# Patient Record
Sex: FEMALE | Race: WHITE | URBAN - METROPOLITAN AREA
[De-identification: names, ages, dates, MRNs, and addresses within clinical notes are randomized per-mention and may not be internally consistent; named-entity substitution may affect disease eponyms.]

---
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
## 2023-06-21 RX ORDER — IPRATROPIUM BROMIDE 21 UG/1
SPRAY, METERED NASAL
COMMUNITY
Start: 2020-02-10 | End: 2024-02-26 | Stop reason: ALTCHOICE

## 2023-06-21 ASSESSMENT — ENCOUNTER SYMPTOMS
COUGH: 1
STRIDOR: 1
HEADACHES: 1
HOARSE VOICE: 1
TROUBLE SWALLOWING: 1
ABDOMINAL PAIN: 0
SWOLLEN GLANDS: 1
DIARRHEA: 0
SHORTNESS OF BREATH: 0
NECK PAIN: 1
VOMITING: 0
SORE THROAT: 1

## 2023-06-23 ENCOUNTER — OFFICE VISIT (OUTPATIENT)
Dept: PRIMARY CARE | Facility: CLINIC | Age: 23
End: 2023-06-23
Payer: COMMERCIAL

## 2023-06-23 VITALS
RESPIRATION RATE: 18 BRPM | SYSTOLIC BLOOD PRESSURE: 120 MMHG | DIASTOLIC BLOOD PRESSURE: 80 MMHG | TEMPERATURE: 97.5 F | HEART RATE: 80 BPM | HEIGHT: 64 IN | OXYGEN SATURATION: 98 % | BODY MASS INDEX: 28.09 KG/M2 | WEIGHT: 164.5 LBS

## 2023-06-23 DIAGNOSIS — J02.9 SORE THROAT: ICD-10-CM

## 2023-06-23 DIAGNOSIS — Z00.00 HEALTH CARE MAINTENANCE: ICD-10-CM

## 2023-06-23 DIAGNOSIS — J40 BRONCHITIS: Primary | ICD-10-CM

## 2023-06-23 DIAGNOSIS — R09.81 CONGESTION OF NASAL SINUS: ICD-10-CM

## 2023-06-23 DIAGNOSIS — R05.1 ACUTE COUGH: ICD-10-CM

## 2023-06-23 DIAGNOSIS — H92.03 OTALGIA OF BOTH EARS: ICD-10-CM

## 2023-06-23 PROCEDURE — 99213 OFFICE O/P EST LOW 20 MIN: CPT | Performed by: FAMILY MEDICINE

## 2023-06-23 PROCEDURE — 1036F TOBACCO NON-USER: CPT | Performed by: FAMILY MEDICINE

## 2023-06-23 RX ORDER — AZITHROMYCIN 250 MG/1
TABLET, FILM COATED ORAL
Qty: 6 TABLET | Refills: 0 | Status: SHIPPED | OUTPATIENT
Start: 2023-06-23 | End: 2023-06-28

## 2023-06-23 ASSESSMENT — ENCOUNTER SYMPTOMS
STRIDOR: 1
DIARRHEA: 0
VOMITING: 0
TROUBLE SWALLOWING: 1
NECK PAIN: 1
SHORTNESS OF BREATH: 0
HEADACHES: 1
HOARSE VOICE: 1
SORE THROAT: 1
SWOLLEN GLANDS: 1
ABDOMINAL PAIN: 0
COUGH: 1

## 2023-06-23 ASSESSMENT — PATIENT HEALTH QUESTIONNAIRE - PHQ9
1. LITTLE INTEREST OR PLEASURE IN DOING THINGS: NOT AT ALL
SUM OF ALL RESPONSES TO PHQ9 QUESTIONS 1 AND 2: 0
2. FEELING DOWN, DEPRESSED OR HOPELESS: NOT AT ALL

## 2023-06-23 NOTE — PROGRESS NOTES
Subjective   Patient ID: Tray Harris is a 23 y.o. female who presents for Establish Care, Cough, and Sore Throat.    HIP    Establish care  Last PCP -- DR. DIAZ   Reason for leaving -- AGE  How did you hear about us -- DAD IS A PATIENT     Sore Throat   This is a new problem. The current episode started in the past 7 days. The problem has been waxing and waning. Neither side of throat is experiencing more pain than the other. There has been no fever. The pain is at a severity of 8/10. Associated symptoms include congestion, coughing, ear pain, headaches, a hoarse voice, neck pain, stridor, swollen glands and trouble swallowing. Pertinent negatives include no abdominal pain, diarrhea, drooling, ear discharge, plugged ear sensation, shortness of breath or vomiting. She has had no exposure to strep or mono.   Cough  This is a new problem. The current episode started in the past 7 days. The cough is Non-productive. Associated symptoms include ear pain, headaches and a sore throat. Pertinent negatives include no shortness of breath.   Earache   This is a new problem. The current episode started in the past 7 days. The problem has been unchanged. There has been no fever. Associated symptoms include coughing, headaches, neck pain and a sore throat. Pertinent negatives include no abdominal pain, diarrhea, ear discharge or vomiting.     ROS  Constitutional- No activity change. No appetite change.  Eyes- Denies vision changes.  Respiratory- No shortness of breath.  Cardiovascular- No palpitations. No chest pain.  GI- No nausea or vomiting. No diarrhea or constipation. Denies abdominal pain.  Musculoskeletal- Denies joint swelling.  Extremities- No edema.  Neurological- Denies headaches. Denies dizziness.  Skin- No rashes.  Psychiatric/Behavioral- Denies significant anxiety, or depressed mood.     Objective     /80 (BP Location: Right arm, Patient Position: Sitting, BP Cuff Size: Adult)   Pulse 80   Temp 36.4 °C (97.5  "°F) (Temporal)   Resp 18   Ht 1.626 m (5' 4\")   Wt 74.6 kg (164 lb 8 oz)   SpO2 98%   BMI 28.24 kg/m²     No Known Allergies    Constitutional-- Well-nourished.  No distress  Head- unremarkable.  Ears- TMs clear.  Eyes- PERRL.  Conjunctiva normal.  Nose- significant for clear rhinorrhea and mild bilateral maxillary sinus pressure  Throat- Oropharynx is mildly inflamed  Neck- Supple with no thyromegaly.  Mild anterior cervical adenopathy noted.  Pulmonary/Chest- Breath sounds with normal effort.  Trace upper airway rhonchi  Heart- Regular rate and rhythm.  No murmur.  Abdomen- Soft and non-tender.  No masses noted.  Extremities- no edema.        Assessment/Plan   1. Bronchitis  azithromycin (Zithromax) 250 mg tablet      2. Health care maintenance        3. Sore throat        4. Acute cough        5. Otalgia of both ears        6. Congestion of nasal sinus           Long talk. Treatment options reviewed.  Mood stable  BP controlled  Asthma controlled    Bronchitis:  Start Z-pack  Use over the counter nasal decongestants, cough suppressants, and cold/flu remedies as needed.    Continue and take your medications as prescribed.    Health Maintenance issues discussed.    Importance of healthy diet and regular exercise regimen discussed.    We will contact you with any test results ordered. If you do not hear from us, please contact.    Follow-up as instructed or sooner if any problems or symptoms do not resolve as expected.    Scribe Attestation  By signing my name below, I Demetrius Giovani Parker   attest that this documentation has been prepared under the direction and in the presence of Vinicio Elizalde MD.    "

## 2024-02-23 NOTE — PROGRESS NOTES
"Subjective   Patient ID: Tray Harris is a 23 y.o. female who presents for Cough.  HPI    Patient presents today complaining of cough/congestion x 3 weeks.  Patient reports cough is productive   Has tried OTC DayQuil with no relief       Problem list discussed.    Overall doing well.  Eating okay.  Staying active.    Has no other new problem /question.    ROS  Constitutional- No activity change. No appetite change.  Eyes- Denies vision changes.  Respiratory- No shortness of breath.  Cardiovascular- No palpitations. No chest pain.  GI- No nausea or vomiting. No diarrhea or constipation. Denies abdominal pain.  Musculoskeletal- Denies joint swelling.  Neurological- Denies headaches. Denies dizziness.  Skin- No rashes.  Psychiatric/Behavioral- Denies significant anxiety, or depressed mood.     Objective     /76 (BP Location: Left arm, Patient Position: Sitting, BP Cuff Size: Adult)   Pulse 90   Temp 36.6 °C (97.9 °F) (Oral)   Ht 1.626 m (5' 4\")   Wt 87.4 kg (192 lb 9.6 oz)   SpO2 98%   BMI 33.06 kg/m²     No Known Allergies    Constitutional-- Well-nourished.  No distress  Head- unremarkable.  Ears- TMs clear.  Eyes- PERRL.  Conjunctiva normal.  Nose- significant for clear rhinorrhea and mild bilateral maxillary sinus pressure  Throat- Oropharynx is mildly inflamed  Neck- Supple with no thyromegaly.  Mild anterior cervical adenopathy noted.  Pulmonary/Chest- Breath sounds with normal effort.  Trace upper airway rhonchi  Heart- Regular rate and rhythm.  No murmur.  Abdomen- Soft and non-tender.  No masses noted.  Extremities- no edema.  Skin no rashes      Assessment/Plan   1. Bronchitis  azithromycin (Zithromax) 250 mg tablet    benzonatate (Tessalon) 100 mg capsule      2. Upper respiratory tract infection, unspecified type  azithromycin (Zithromax) 250 mg tablet             Long talk. Treatment options reviewed.  Start antibiotic as directed.  Tessalon Perles as needed for cough    Continue and take your " medications as prescribed.    Health Maintenance issues discussed.      Follow-up as instructed or sooner if any problems or symptoms do not resolve as expected.

## 2024-02-24 ASSESSMENT — ENCOUNTER SYMPTOMS
HEARTBURN: 1
WEIGHT LOSS: 0
SHORTNESS OF BREATH: 0
MYALGIAS: 0
SWEATS: 0
COUGH: 1
HEADACHES: 1
RHINORRHEA: 1
SORE THROAT: 1
FEVER: 0
HEMOPTYSIS: 0
CHILLS: 0
WHEEZING: 0

## 2024-02-26 ENCOUNTER — OFFICE VISIT (OUTPATIENT)
Dept: PRIMARY CARE | Facility: CLINIC | Age: 24
End: 2024-02-26
Payer: COMMERCIAL

## 2024-02-26 VITALS
DIASTOLIC BLOOD PRESSURE: 76 MMHG | HEIGHT: 64 IN | TEMPERATURE: 97.9 F | HEART RATE: 90 BPM | SYSTOLIC BLOOD PRESSURE: 118 MMHG | BODY MASS INDEX: 32.88 KG/M2 | OXYGEN SATURATION: 98 % | WEIGHT: 192.6 LBS

## 2024-02-26 DIAGNOSIS — J40 BRONCHITIS: Primary | ICD-10-CM

## 2024-02-26 DIAGNOSIS — J06.9 UPPER RESPIRATORY TRACT INFECTION, UNSPECIFIED TYPE: ICD-10-CM

## 2024-02-26 PROCEDURE — 99213 OFFICE O/P EST LOW 20 MIN: CPT | Performed by: FAMILY MEDICINE

## 2024-02-26 PROCEDURE — 1036F TOBACCO NON-USER: CPT | Performed by: FAMILY MEDICINE

## 2024-02-26 RX ORDER — AZITHROMYCIN 250 MG/1
TABLET, FILM COATED ORAL
Qty: 6 TABLET | Refills: 0 | Status: SHIPPED | OUTPATIENT
Start: 2024-02-26 | End: 2024-03-01

## 2024-02-26 RX ORDER — BENZONATATE 100 MG/1
100 CAPSULE ORAL 3 TIMES DAILY PRN
Qty: 30 CAPSULE | Refills: 0 | Status: SHIPPED | OUTPATIENT
Start: 2024-02-26 | End: 2024-03-27

## 2024-02-26 ASSESSMENT — PATIENT HEALTH QUESTIONNAIRE - PHQ9
2. FEELING DOWN, DEPRESSED OR HOPELESS: NOT AT ALL
1. LITTLE INTEREST OR PLEASURE IN DOING THINGS: NOT AT ALL
SUM OF ALL RESPONSES TO PHQ9 QUESTIONS 1 AND 2: 0

## 2024-10-16 ENCOUNTER — APPOINTMENT (OUTPATIENT)
Dept: PRIMARY CARE | Facility: CLINIC | Age: 24
End: 2024-10-16
Payer: COMMERCIAL

## 2024-10-16 VITALS
WEIGHT: 192 LBS | BODY MASS INDEX: 32.78 KG/M2 | HEART RATE: 79 BPM | DIASTOLIC BLOOD PRESSURE: 78 MMHG | SYSTOLIC BLOOD PRESSURE: 122 MMHG | OXYGEN SATURATION: 97 % | TEMPERATURE: 96.5 F | HEIGHT: 64 IN

## 2024-10-16 DIAGNOSIS — K29.00 ACUTE GASTRITIS WITHOUT HEMORRHAGE, UNSPECIFIED GASTRITIS TYPE: ICD-10-CM

## 2024-10-16 DIAGNOSIS — R10.9 ABDOMINAL PAIN, UNSPECIFIED ABDOMINAL LOCATION: Primary | ICD-10-CM

## 2024-10-16 PROCEDURE — 99213 OFFICE O/P EST LOW 20 MIN: CPT | Performed by: FAMILY MEDICINE

## 2024-10-16 PROCEDURE — 1036F TOBACCO NON-USER: CPT | Performed by: FAMILY MEDICINE

## 2024-10-16 PROCEDURE — 3008F BODY MASS INDEX DOCD: CPT | Performed by: FAMILY MEDICINE

## 2024-10-16 RX ORDER — FAMOTIDINE 20 MG/1
20 TABLET, FILM COATED ORAL 2 TIMES DAILY
Qty: 60 TABLET | Refills: 1 | Status: SHIPPED | OUTPATIENT
Start: 2024-10-16 | End: 2024-12-15

## 2024-10-16 NOTE — PROGRESS NOTES
"Subjective   Patient ID: Tray Harris is a 24 y.o. female who presents for Abdominal Pain.  HPI    Patient presents in office today for an ER follow up. Patient went for Abdominal pain. Ongoing x 6 days. Patient admits she has tried Tylenol OTC, and Midol. Patient had BW, Urine testing, and US gallbladder done while in the ER. Patient went to [facility] on 10/1/24.  Symptoms localized to the epigastric area.  Denies any black or tarry stools ever.  Denies any blood in her stool.    Patient would like the US to be reviewed. Patient states that they told her that the right kidney was inflamed. Patient does have an upcoming appointment for a CT scan.    Problem list discussed.    Overall doing better.  Symptoms have essentially resolved.  Eating okay.  Staying active.    Has no other new problem /question.     ROS  Constitutional- No activity change. No appetite change.  Eyes- Denies vision changes.  Respiratory- No shortness of breath.  Cardiovascular- No palpitations. No chest pain.  GI- abdominal pain.  Musculoskeletal- Denies joint swelling.  Extremities- No edema.  Neurological- Denies headaches. Denies dizziness.  Skin- No rashes.  Psychiatric/Behavioral- Denies significant anxiety, or depressed mood.     Objective     /78 (BP Location: Right arm, Patient Position: Sitting)   Pulse 79   Temp 35.8 °C (96.5 °F) (Temporal)   Ht 1.626 m (5' 4\")   Wt 87.1 kg (192 lb)   SpO2 97%   BMI 32.96 kg/m²     No Known Allergies    Constitutional-- Well-nourished.  No distress  Head- unremarkable.  Eyes- PERRL.  Conjunctiva normal.  Nose- Normal.  No rhinorrhea noted.  Throat- Oropharynx is clear and moist.  Neck- Supple with no thyromegaly.  No significant cervical adenopathy noted.  Pulmonary/Chest- Breath sounds normal with normal effort.  No wheezing.  Heart- Regular rate and rhythm.  No murmur.  Abdomen- Soft and non-tender.  No masses noted.  Musculoskeletal- Normal ROM.  No significant joint " swelling  Extremities- No edema.   Neurological- Alert.  No noted deficits.  Skin- Warm.  No rashes.  Psychiatric/Behavioral- Mood and affect normal.  Behavior normal.     Assessment/Plan   1. Abdominal pain, unspecified abdominal location  famotidine (Pepcid) 20 mg tablet      2. Acute gastritis without hemorrhage, unspecified gastritis type  famotidine (Pepcid) 20 mg tablet             Long talk. Treatment options reviewed.    Reviewed most recent lab work with patient. Advised patient to remain up to date on routine maintenance and health screening.  Maintain appointments with specialists as scheduled.      Discussed abdominal pain.  Discussed gastritis.  Take Pepcid as discussed.  Will take twice a day for 1 week then once daily for a month then discontinue.  Continue to follow-up with test recommended by urology.  Will hold off GI referral    Discussed importance of natural sources of nutrition.  Advised patient to consume vegetables, salads, fruits, nuts, and proteins such as fish and chicken.  Discussed portion control.      Health Maintenance issues discussed.    Importance of healthy diet and regular exercise regimen discussed.    We will contact you with any test results ordered. If you do not hear from us, please contact.    Follow-up as instructed or sooner if any problems or symptoms do not resolve as expected.        Scribe Attestation  By signing my name below, IStephanie Scribe   attest that this documentation has been prepared under the direction and in the presence of Vinicio Elizalde MD.

## 2024-10-18 ENCOUNTER — TELEPHONE (OUTPATIENT)
Dept: PRIMARY CARE | Facility: CLINIC | Age: 24
End: 2024-10-18
Payer: COMMERCIAL

## 2024-10-18 NOTE — TELEPHONE ENCOUNTER
Vinicio Elizalde MD  [id number] [place] [address] Clinical Support Staff1 hour ago (12:20 PM)       Please let her know that this is not likely the medication she is taking.  That being said I would stop the medicine for a day and avoid any aspirin containing products.  Can resume tomorrow.  If it becomes a continued issue she should let us know next week.  Please let her know

## 2024-10-18 NOTE — TELEPHONE ENCOUNTER
Started taking famotidine yesterday,  has not taken it yet today because her nose started bleeding  this morning for 20-30 minutes. Could this be from the medication. Please advise? [phone]